# Patient Record
Sex: MALE | Race: BLACK OR AFRICAN AMERICAN | NOT HISPANIC OR LATINO | URBAN - METROPOLITAN AREA
[De-identification: names, ages, dates, MRNs, and addresses within clinical notes are randomized per-mention and may not be internally consistent; named-entity substitution may affect disease eponyms.]

---

## 2023-06-26 ENCOUNTER — OFFICE VISIT (OUTPATIENT)
Dept: URBAN - METROPOLITAN AREA SURGERY CENTER 30 | Facility: SURGERY CENTER | Age: 68
End: 2023-06-26
Payer: MEDICARE

## 2023-06-26 DIAGNOSIS — Z12.11 COLON CANCER SCREENING: ICD-10-CM

## 2023-06-26 PROCEDURE — G8907 PT DOC NO EVENTS ON DISCHARG: HCPCS | Performed by: INTERNAL MEDICINE

## 2023-06-26 PROCEDURE — G0121 COLON CA SCRN NOT HI RSK IND: HCPCS | Performed by: INTERNAL MEDICINE

## 2024-01-17 ENCOUNTER — OFFICE VISIT (OUTPATIENT)
Dept: URBAN - METROPOLITAN AREA CLINIC 40 | Facility: CLINIC | Age: 69
End: 2024-01-17
Payer: MEDICARE

## 2024-01-17 VITALS
SYSTOLIC BLOOD PRESSURE: 142 MMHG | HEIGHT: 69 IN | HEART RATE: 93 BPM | TEMPERATURE: 97.7 F | WEIGHT: 181.6 LBS | BODY MASS INDEX: 26.9 KG/M2 | DIASTOLIC BLOOD PRESSURE: 94 MMHG

## 2024-01-17 DIAGNOSIS — R10.13 EPIGASTRIC DISCOMFORT: ICD-10-CM

## 2024-01-17 DIAGNOSIS — K21.9 CHRONIC GERD: ICD-10-CM

## 2024-01-17 PROBLEM — 235595009: Status: ACTIVE | Noted: 2024-01-17

## 2024-01-17 PROCEDURE — 99214 OFFICE O/P EST MOD 30 MIN: CPT | Performed by: NURSE PRACTITIONER

## 2024-01-17 RX ORDER — OMEPRAZOLE 20 MG/1
1 CAPSULE 30 MINUTES BEFORE MORNING MEAL CAPSULE, DELAYED RELEASE ORAL ONCE A DAY
Status: ACTIVE | COMMUNITY

## 2024-01-17 RX ORDER — AMLODIPINE AND ATORVASTATIN 10; 10 MG/1; MG/1
1 TABLET TABLET, FILM COATED ORAL ONCE A DAY
Status: ACTIVE | COMMUNITY

## 2024-01-17 RX ORDER — OMEPRAZOLE 40 MG/1
1 CAPSULE 30 MINUTES BEFORE MORNING MEAL CAPSULE, DELAYED RELEASE ORAL ONCE A DAY
Qty: 30 | Refills: 3 | OUTPATIENT
Start: 2024-01-17

## 2024-01-17 RX ORDER — ROSUVASTATIN CALCIUM 20 MG/1
1 TABLET TABLET, COATED ORAL ONCE A DAY
Status: ACTIVE | COMMUNITY

## 2024-01-17 NOTE — HPI-TODAY'S VISIT:
68-year-old male with past medical history as listed below, had direct access colonoscopy with Dr. Jones June 26, 2023, normal digital rectal exam, internal hemorrhoids, scattered small and large mouth diverticula found in the left colon, otherwise without abnormality. Repeat colonoscopy in 10 years.  -- The patient presents with c/o worsening chronic reflux. He has been on omeprazole for 20 mg for over a year. Last 2 months  has had uncontrolled acid reflux, epigastric tenderness, burning. Denies dysphagia. Has some right sided low back as well. He has never had EGD

## 2024-01-19 ENCOUNTER — TELEPHONE ENCOUNTER (OUTPATIENT)
Dept: URBAN - METROPOLITAN AREA CLINIC 74 | Facility: CLINIC | Age: 69
End: 2024-01-19

## 2024-01-19 RX ORDER — OMEPRAZOLE 40 MG/1
1 CAPSULE 30 MINUTES BEFORE MORNING MEAL CAPSULE, DELAYED RELEASE ORAL ONCE A DAY
Qty: 30 | Refills: 5 | OUTPATIENT
Start: 2024-01-17

## 2024-02-14 ENCOUNTER — OV EP (OUTPATIENT)
Dept: URBAN - METROPOLITAN AREA CLINIC 40 | Facility: CLINIC | Age: 69
End: 2024-02-14
Payer: MEDICARE

## 2024-02-14 VITALS
BODY MASS INDEX: 26.48 KG/M2 | DIASTOLIC BLOOD PRESSURE: 82 MMHG | WEIGHT: 178.8 LBS | HEIGHT: 69 IN | TEMPERATURE: 97.7 F | SYSTOLIC BLOOD PRESSURE: 138 MMHG | HEART RATE: 88 BPM

## 2024-02-14 DIAGNOSIS — K21.9 CHRONIC GERD: ICD-10-CM

## 2024-02-14 PROCEDURE — 99213 OFFICE O/P EST LOW 20 MIN: CPT | Performed by: NURSE PRACTITIONER

## 2024-02-14 RX ORDER — ROSUVASTATIN CALCIUM 20 MG/1
1 TABLET TABLET, COATED ORAL ONCE A DAY
Status: ACTIVE | COMMUNITY

## 2024-02-14 RX ORDER — OMEPRAZOLE 20 MG/1
1 CAPSULE 30 MINUTES BEFORE MORNING MEAL CAPSULE, DELAYED RELEASE ORAL ONCE A DAY
Status: ON HOLD | COMMUNITY

## 2024-02-14 RX ORDER — OMEPRAZOLE 40 MG/1
1 CAPSULE 30 MINUTES BEFORE MORNING MEAL CAPSULE, DELAYED RELEASE ORAL ONCE A DAY
Qty: 30 | Refills: 5 | Status: ACTIVE | COMMUNITY
Start: 2024-01-17

## 2024-02-14 RX ORDER — AMLODIPINE AND ATORVASTATIN 10; 10 MG/1; MG/1
1 TABLET TABLET, FILM COATED ORAL ONCE A DAY
Status: ACTIVE | COMMUNITY

## 2024-02-14 NOTE — HPI-TODAY'S VISIT:
68-year-old male with past medical history as listed below, had direct access colonoscopy with Dr. Jones June 26, 2023, normal digital rectal exam, internal hemorrhoids, scattered small and large mouth diverticula found in the left colon, otherwise without abnormality. Repeat colonoscopy in 10 years.  --01/17/24: Recall seen by me  with c/o worsening chronic reflux. He has been on omeprazole for 20 mg for over a year. Last 2 worse reflux, epigastric tenderness, burning. Denied dysphagia. Has some right sided low back as well. He has never had EGD.  Increased omeprazole to 40 mg daily in morning. If effective will remain at this dose, if not consider EGD and or switch to pantoprazole vs adding famotidine at bedtime. Low back pain likely musculoskeletal, no constipation or urinary symptoms accompany, continue to monitor.  -- The patient presents today for f/u. He is doing much better on omeprazole 40 mg daily. Discussed I sent him 5 refills to his pharmacy. He has done some research and asks about long term effects of PPI and dementia. Explained that there has been no valid evidence in studies of dementia associated with PPI use. There is some concern for bone density and vitamin, mineral absorption with long term use. Discussed there is no cure, just diet, lifestyle and medication when needed. Hernia repair when indicated. He is taking Vit B12 and Vit D supplement, follows with PCP regularly. GERD handout given.

## 2024-06-24 ENCOUNTER — TELEPHONE ENCOUNTER (OUTPATIENT)
Dept: URBAN - METROPOLITAN AREA CLINIC 40 | Facility: CLINIC | Age: 69
End: 2024-06-24

## 2024-06-24 RX ORDER — OMEPRAZOLE 40 MG/1
1 CAPSULE 30 MINUTES BEFORE MORNING MEAL CAPSULE, DELAYED RELEASE ORAL ONCE A DAY
Qty: 30 | Refills: 5
Start: 2024-01-17

## 2024-08-21 ENCOUNTER — OFFICE VISIT (OUTPATIENT)
Dept: URBAN - METROPOLITAN AREA CLINIC 40 | Facility: CLINIC | Age: 69
End: 2024-08-21